# Patient Record
Sex: MALE | Race: WHITE | NOT HISPANIC OR LATINO | Employment: UNEMPLOYED | URBAN - METROPOLITAN AREA
[De-identification: names, ages, dates, MRNs, and addresses within clinical notes are randomized per-mention and may not be internally consistent; named-entity substitution may affect disease eponyms.]

---

## 2018-08-08 ENCOUNTER — COMMUNICATION - HEALTHEAST (OUTPATIENT)
Dept: TELEHEALTH | Facility: CLINIC | Age: 33
End: 2018-08-08

## 2018-08-08 ENCOUNTER — OFFICE VISIT - HEALTHEAST (OUTPATIENT)
Dept: FAMILY MEDICINE | Facility: CLINIC | Age: 33
End: 2018-08-08

## 2018-08-08 DIAGNOSIS — E11.9 TYPE 2 DIABETES MELLITUS WITHOUT COMPLICATION, WITHOUT LONG-TERM CURRENT USE OF INSULIN (H): ICD-10-CM

## 2018-08-08 DIAGNOSIS — Z48.89 ENCOUNTER FOR POST SURGICAL WOUND CHECK: ICD-10-CM

## 2018-08-08 DIAGNOSIS — Z76.89 ESTABLISHING CARE WITH NEW DOCTOR, ENCOUNTER FOR: ICD-10-CM

## 2018-08-08 DIAGNOSIS — E66.01 MORBID OBESITY (H): ICD-10-CM

## 2018-08-08 LAB
CHOLEST SERPL-MCNC: 194 MG/DL
FASTING STATUS PATIENT QL REPORTED: NO
HBA1C MFR BLD: 12.1 % (ref 3.5–6)
HDLC SERPL-MCNC: 49 MG/DL
LDLC SERPL CALC-MCNC: 118 MG/DL
TRIGL SERPL-MCNC: 136 MG/DL

## 2018-08-08 ASSESSMENT — MIFFLIN-ST. JEOR: SCORE: 2413.18

## 2018-08-13 ENCOUNTER — OFFICE VISIT - HEALTHEAST (OUTPATIENT)
Dept: FAMILY MEDICINE | Facility: CLINIC | Age: 33
End: 2018-08-13

## 2018-08-13 DIAGNOSIS — E11.9 DIABETES MELLITUS, TYPE 2 (H): ICD-10-CM

## 2018-08-13 LAB
ALBUMIN SERPL-MCNC: 3.6 G/DL (ref 3.5–5)
ALP SERPL-CCNC: 106 U/L (ref 45–120)
ALT SERPL W P-5'-P-CCNC: 19 U/L (ref 0–45)
ANION GAP SERPL CALCULATED.3IONS-SCNC: 14 MMOL/L (ref 5–18)
AST SERPL W P-5'-P-CCNC: 11 U/L (ref 0–40)
BILIRUB SERPL-MCNC: 0.6 MG/DL (ref 0–1)
BUN SERPL-MCNC: 12 MG/DL (ref 8–22)
CALCIUM SERPL-MCNC: 9.2 MG/DL (ref 8.5–10.5)
CHLORIDE BLD-SCNC: 100 MMOL/L (ref 98–107)
CO2 SERPL-SCNC: 22 MMOL/L (ref 22–31)
CREAT SERPL-MCNC: 0.8 MG/DL (ref 0.7–1.3)
GFR SERPL CREATININE-BSD FRML MDRD: >60 ML/MIN/1.73M2
GLUCOSE BLD-MCNC: 389 MG/DL (ref 70–125)
POTASSIUM BLD-SCNC: 4.2 MMOL/L (ref 3.5–5)
PROT SERPL-MCNC: 7 G/DL (ref 6–8)
SODIUM SERPL-SCNC: 136 MMOL/L (ref 136–145)

## 2018-08-16 ENCOUNTER — AMBULATORY - HEALTHEAST (OUTPATIENT)
Dept: EDUCATION SERVICES | Facility: CLINIC | Age: 33
End: 2018-08-16

## 2018-08-16 DIAGNOSIS — E08.65 DIABETES MELLITUS DUE TO UNDERLYING CONDITION WITH HYPERGLYCEMIA (H): ICD-10-CM

## 2018-09-10 ENCOUNTER — COMMUNICATION - HEALTHEAST (OUTPATIENT)
Dept: FAMILY MEDICINE | Facility: CLINIC | Age: 33
End: 2018-09-10

## 2018-09-10 DIAGNOSIS — E11.9 DIABETES MELLITUS, TYPE 2 (H): ICD-10-CM

## 2018-09-20 ENCOUNTER — OFFICE VISIT - HEALTHEAST (OUTPATIENT)
Dept: EDUCATION SERVICES | Facility: CLINIC | Age: 33
End: 2018-09-20

## 2018-09-20 DIAGNOSIS — E08.65 DIABETES MELLITUS DUE TO UNDERLYING CONDITION WITH HYPERGLYCEMIA (H): ICD-10-CM

## 2018-10-08 ENCOUNTER — OFFICE VISIT - HEALTHEAST (OUTPATIENT)
Dept: FAMILY MEDICINE | Facility: CLINIC | Age: 33
End: 2018-10-08

## 2018-10-08 DIAGNOSIS — E11.9 TYPE 2 DIABETES MELLITUS WITHOUT COMPLICATION, WITHOUT LONG-TERM CURRENT USE OF INSULIN (H): ICD-10-CM

## 2018-12-03 ENCOUNTER — OFFICE VISIT - HEALTHEAST (OUTPATIENT)
Dept: FAMILY MEDICINE | Facility: CLINIC | Age: 33
End: 2018-12-03

## 2018-12-03 DIAGNOSIS — E11.9 DIABETES MELLITUS, TYPE 2 (H): ICD-10-CM

## 2018-12-03 DIAGNOSIS — E08.65 DIABETES MELLITUS DUE TO UNDERLYING CONDITION WITH HYPERGLYCEMIA (H): ICD-10-CM

## 2018-12-03 LAB — HBA1C MFR BLD: 6.1 % (ref 3.5–6)

## 2018-12-03 RX ORDER — GLUCOSAMINE HCL/CHONDROITIN SU 500-400 MG
CAPSULE ORAL
Qty: 100 STRIP | Refills: 9 | Status: SHIPPED | OUTPATIENT
Start: 2018-12-03

## 2018-12-05 ENCOUNTER — COMMUNICATION - HEALTHEAST (OUTPATIENT)
Dept: FAMILY MEDICINE | Facility: CLINIC | Age: 33
End: 2018-12-05

## 2019-02-10 ENCOUNTER — COMMUNICATION - HEALTHEAST (OUTPATIENT)
Dept: EDUCATION SERVICES | Facility: CLINIC | Age: 34
End: 2019-02-10

## 2019-02-10 DIAGNOSIS — E08.65 DIABETES MELLITUS DUE TO UNDERLYING CONDITION WITH HYPERGLYCEMIA (H): ICD-10-CM

## 2019-03-27 ENCOUNTER — OFFICE VISIT - HEALTHEAST (OUTPATIENT)
Dept: FAMILY MEDICINE | Facility: CLINIC | Age: 34
End: 2019-03-27

## 2019-03-27 DIAGNOSIS — E11.9 DIABETES MELLITUS, TYPE 2 (H): ICD-10-CM

## 2019-03-27 LAB
CREAT UR-MCNC: 118.3 MG/DL
HBA1C MFR BLD: 6 % (ref 3.5–6)
MICROALBUMIN UR-MCNC: 0.94 MG/DL (ref 0–1.99)
MICROALBUMIN/CREAT UR: 7.9 MG/G

## 2019-08-21 ENCOUNTER — COMMUNICATION - HEALTHEAST (OUTPATIENT)
Dept: TELEHEALTH | Facility: CLINIC | Age: 34
End: 2019-08-21

## 2019-08-21 ENCOUNTER — AMBULATORY - HEALTHEAST (OUTPATIENT)
Dept: FAMILY MEDICINE | Facility: CLINIC | Age: 34
End: 2019-08-21

## 2019-08-21 ENCOUNTER — OFFICE VISIT - HEALTHEAST (OUTPATIENT)
Dept: FAMILY MEDICINE | Facility: CLINIC | Age: 34
End: 2019-08-21

## 2019-08-21 ENCOUNTER — COMMUNICATION - HEALTHEAST (OUTPATIENT)
Dept: FAMILY MEDICINE | Facility: CLINIC | Age: 34
End: 2019-08-21

## 2019-08-21 DIAGNOSIS — Z31.69 INFERTILITY COUNSELING: ICD-10-CM

## 2019-08-21 DIAGNOSIS — N46.9 MALE INFERTILITY: ICD-10-CM

## 2019-08-21 DIAGNOSIS — E11.9 DIABETES MELLITUS, TYPE 2 (H): ICD-10-CM

## 2019-08-21 DIAGNOSIS — Z31.41 FERTILITY TESTING: ICD-10-CM

## 2019-08-21 LAB — HBA1C MFR BLD: 6.2 % (ref 3.5–6)

## 2019-08-21 ASSESSMENT — MIFFLIN-ST. JEOR: SCORE: 2338.34

## 2019-08-23 ENCOUNTER — COMMUNICATION - HEALTHEAST (OUTPATIENT)
Dept: FAMILY MEDICINE | Facility: CLINIC | Age: 34
End: 2019-08-23

## 2019-08-29 DIAGNOSIS — Z31.41 ENCOUNTER FOR SPERM COUNT FOR FERTILITY TESTING: Primary | ICD-10-CM

## 2019-08-30 ENCOUNTER — RECORDS - HEALTHEAST (OUTPATIENT)
Dept: ADMINISTRATIVE | Facility: OTHER | Age: 34
End: 2019-08-30

## 2019-08-30 DIAGNOSIS — Z31.41 ENCOUNTER FOR SPERM COUNT FOR FERTILITY TESTING: Primary | ICD-10-CM

## 2019-08-30 PROCEDURE — 89320 SEMEN ANAL VOL/COUNT/MOT: CPT

## 2019-09-03 LAB
ABSTINENCE DAYS: 7 DAYS (ref 2–7)
AGGLUTINATION: NO YES/NO
ANALYSIS TEMP - CENTIGRADE: 23 CENTIGRADE
CELL FRAGMENTS: ABNORMAL %
COLLECTION METHOD: ABNORMAL
COLLECTION SITE: ABNORMAL
CONSENT TO RELEASE TO PARTNER: YES
IMMATURE SPERM: ABNORMAL %
IMMOTILE: 84 %
LAB RECEIPT TIME: ABNORMAL
LIQUEFIED: YES YES/NO
NON-PROGRESSIVE MOTILITY: 6 %
PROGRESSIVE MOTILITY: 10 % (ref 32–?)
ROUND CELLS: 0 MILLION/ML (ref ?–2)
SPECIMEN CONCENTRATION: 0.14 MILLION/ML (ref 15–?)
SPECIMEN PH: 7.2 PH (ref 7.2–?)
SPECIMEN TYPE: ABNORMAL
SPECIMEN VOL UR: 1.8 ML (ref 1.5–?)
TIME OF ANALYSIS: ABNORMAL
TOTAL NUMBER: 0.25 MILLION (ref 39–?)
TOTAL PROGRESSIVE MOTILE: 0.03 MILLION (ref 15.6–?)
VISCOUS: NO YES/NO
VITALITY: ABNORMAL % (ref 58–?)
WBC SPECIMEN: ABNORMAL %

## 2019-09-27 ENCOUNTER — OFFICE VISIT - HEALTHEAST (OUTPATIENT)
Dept: FAMILY MEDICINE | Facility: CLINIC | Age: 34
End: 2019-09-27

## 2019-09-27 DIAGNOSIS — N46.9 MALE INFERTILITY: ICD-10-CM

## 2019-09-27 DIAGNOSIS — E11.9 DIABETES MELLITUS, TYPE 2 (H): ICD-10-CM

## 2019-09-27 ASSESSMENT — MIFFLIN-ST. JEOR: SCORE: 2340.6

## 2019-11-26 ENCOUNTER — OFFICE VISIT - HEALTHEAST (OUTPATIENT)
Dept: FAMILY MEDICINE | Facility: CLINIC | Age: 34
End: 2019-11-26

## 2019-11-26 ENCOUNTER — COMMUNICATION - HEALTHEAST (OUTPATIENT)
Dept: TELEHEALTH | Facility: CLINIC | Age: 34
End: 2019-11-26

## 2019-11-26 DIAGNOSIS — Z00.00 ANNUAL PHYSICAL EXAM: ICD-10-CM

## 2019-11-26 DIAGNOSIS — E08.65 DIABETES MELLITUS DUE TO UNDERLYING CONDITION WITH HYPERGLYCEMIA, WITHOUT LONG-TERM CURRENT USE OF INSULIN (H): ICD-10-CM

## 2019-11-26 DIAGNOSIS — E66.01 MORBID OBESITY (H): ICD-10-CM

## 2019-11-26 LAB
ANION GAP SERPL CALCULATED.3IONS-SCNC: 14 MMOL/L (ref 5–18)
BUN SERPL-MCNC: 18 MG/DL (ref 8–22)
CALCIUM SERPL-MCNC: 9.4 MG/DL (ref 8.5–10.5)
CHLORIDE BLD-SCNC: 105 MMOL/L (ref 98–107)
CHOLEST SERPL-MCNC: 151 MG/DL
CO2 SERPL-SCNC: 22 MMOL/L (ref 22–31)
CREAT SERPL-MCNC: 0.81 MG/DL (ref 0.7–1.3)
FASTING STATUS PATIENT QL REPORTED: YES
GFR SERPL CREATININE-BSD FRML MDRD: >60 ML/MIN/1.73M2
GLUCOSE BLD-MCNC: 134 MG/DL (ref 70–125)
HBA1C MFR BLD: 6.5 % (ref 3.5–6)
HDLC SERPL-MCNC: 51 MG/DL
HIV 1+2 AB+HIV1 P24 AG SERPL QL IA: NEGATIVE
LDLC SERPL CALC-MCNC: 87 MG/DL
POTASSIUM BLD-SCNC: 4.1 MMOL/L (ref 3.5–5)
SODIUM SERPL-SCNC: 141 MMOL/L (ref 136–145)
TRIGL SERPL-MCNC: 64 MG/DL

## 2019-11-26 ASSESSMENT — MIFFLIN-ST. JEOR: SCORE: 2365.55

## 2019-11-27 ENCOUNTER — COMMUNICATION - HEALTHEAST (OUTPATIENT)
Dept: FAMILY MEDICINE | Facility: CLINIC | Age: 34
End: 2019-11-27

## 2020-03-11 ENCOUNTER — HEALTH MAINTENANCE LETTER (OUTPATIENT)
Age: 35
End: 2020-03-11

## 2020-06-02 ENCOUNTER — COMMUNICATION - HEALTHEAST (OUTPATIENT)
Dept: FAMILY MEDICINE | Facility: CLINIC | Age: 35
End: 2020-06-02

## 2020-06-02 DIAGNOSIS — E11.9 DIABETES MELLITUS, TYPE 2 (H): ICD-10-CM

## 2020-06-02 RX ORDER — METFORMIN HCL 500 MG
1000 TABLET, EXTENDED RELEASE 24 HR ORAL 2 TIMES DAILY
Qty: 120 TABLET | Refills: 5 | Status: SHIPPED | OUTPATIENT
Start: 2020-06-02

## 2020-10-27 ENCOUNTER — RECORDS - HEALTHEAST (OUTPATIENT)
Dept: ADMINISTRATIVE | Facility: OTHER | Age: 35
End: 2020-10-27

## 2020-10-27 LAB
CREAT SERPL-MCNC: 0.6 MG/DL (ref 0.76–1.27)
GFR ESTIMATE EXT - HISTORICAL: 130 ML/MIN/1.73M2
GFR ESTIMATE, IF BLACK EXT - HISTORICAL: 151 ML/MIN/1.73M2
HBA1C MFR BLD: 8.5 % (ref 4.8–5.6)

## 2020-11-04 ENCOUNTER — RECORDS - HEALTHEAST (OUTPATIENT)
Dept: HEALTH INFORMATION MANAGEMENT | Facility: CLINIC | Age: 35
End: 2020-11-04

## 2021-01-03 ENCOUNTER — HEALTH MAINTENANCE LETTER (OUTPATIENT)
Age: 36
End: 2021-01-03

## 2021-02-25 ENCOUNTER — RECORDS - HEALTHEAST (OUTPATIENT)
Dept: ADMINISTRATIVE | Facility: OTHER | Age: 36
End: 2021-02-25

## 2021-04-25 ENCOUNTER — HEALTH MAINTENANCE LETTER (OUTPATIENT)
Age: 36
End: 2021-04-25

## 2021-05-27 NOTE — PROGRESS NOTES
Please call patient and inform:  A1c is stable at 6.0.  Awesome job.  Kidneys also look fine.  Let us go ahead with the plan we discussed at our clinic visit today and I will see you in 6 months.

## 2021-05-27 NOTE — PROGRESS NOTES
[facility] clinic EXAM note      Chief Complaint   Patient presents with     Follow-up     medications     Medication Refill     metformin       Assessment & Plan    Problem List Items Addressed This Visit     None      Visit Diagnoses     Diabetes mellitus, type 2 (H)    : A1c came back at 6.0!  Continues to do an awesome job.  We will go forward with his plan to discontinue the Invokana for now.  Can finish up his last bottle and then take a break and continue to monitor his blood sugars during that time.  If he notices them start to go up on average he can restart the Invokana.  Discussed that also helps with weight gain which has been really nice for him having lost 30 pounds.  Otherwise we will continue the metformin 1000 mg twice a day.  At this point very well controlled and will have him follow-up in 6 months.  Microalbumin today.  Foot exam completed today as below.  We will plan for ophthalmology referral at next visit due in July.  Does not need statin at this time as no other CVD risk factors besides weight and blood pressures well controlled.    Relevant Medications    metFORMIN (GLUCOPHAGE-XR) 500 MG 24 hr tablet    Other Relevant Orders    Glycosylated Hemoglobin A1c (Completed)    Microalbumin, Random Urine          History    Everardo Payan is a 33 y.o.  male who presents for the following issues:    Follow-up diabetes type 2:  Patient comes in for follow-up last seen December 3.  A1c at that time had come down to 6.1!  He states blood sugars are still doing well in average 120.  Seem pretty stable in this range.  Looking through a scan 111-130 he had about 3 outliers in the last 4 months.  One being [place] Day.  He has lost 30 pounds since August.  Down to 304lb.  Last saw ophthalmologist in July.  He was unable to give urine sample at last visit so we will check microalbumin today.  He is wondering if his blood sugars continue to be well controlled and his A1c looks stable today if he can  come off the Invokana.  States $50 a month.  He still has a little bit left of his last refill.  He also notes that he has refills available that he could get within the next year if he notices his blood sugars start to go up if he stops taking it.  Otherwise he states he is doing well feeling good.  No vision changes, no numbness or tingling.        mEDICATIONS    Current Outpatient Medications on File Prior to Visit   Medication Sig Dispense Refill     blood glucose test (CONTOUR NEXT TEST STRIPS) strips Check blood sugar 1-2 times daily, rotate fasting and 2 after meal.. 100 strip 9     generic lancets (MICROLET LANCET) Dispense brand per patient's insurance at pharmacy discretion. 100 each 6     INVOKANA 100 mg Tab TAKE ONE TABLET BY MOUTH ONE TIME DAILY BEFORE THE FIRST MEAL OF THE DAY 30 tablet 3     [DISCONTINUED] metFORMIN (GLUCOPHAGE-XR) 500 MG 24 hr tablet Take 2 tablets (1,000 mg total) by mouth 2 (two) times a day. 120 tablet 5     No current facility-administered medications on file prior to visit.        Pertinent past medical, surgical, social and family history reviewed and updated in [place].    Social History     Socioeconomic History     Marital status: Single     Spouse name: Not on file     Number of children: Not on file     Years of education: Not on file     Highest education level: Not on file   Occupational History     Not on file   Social Needs     Financial resource strain: Not on file     Food insecurity:     Worry: Not on file     Inability: Not on file     Transportation needs:     Medical: Not on file     Non-medical: Not on file   Tobacco Use     Smoking status: Never Smoker     Smokeless tobacco: Never Used     Tobacco comment: no tobacco exposure   Substance and Sexual Activity     Alcohol use: Not on file     Drug use: Not on file     Sexual activity: Not on file   Lifestyle     Physical activity:     Days per week: Not on file     Minutes per session: Not on file     Stress: Not on  file   Relationships     Social connections:     Talks on phone: Not on file     Gets together: Not on file     Attends Sikhism service: Not on file     Active member of club or organization: Not on file     Attends meetings of clubs or organizations: Not on file     Relationship status: Not on file     Intimate partner violence:     Fear of current or ex partner: Not on file     Emotionally abused: Not on file     Physically abused: Not on file     Forced sexual activity: Not on file   Other Topics Concern     Not on file   Social History Narrative    Patient is .  He completed college.  He works at [employer].  No kids yet.         Review of systems     Pertinent Positives and negatives in HPI.     Physical Exam    /72 (Patient Site: Left Arm, Patient Position: Sitting, Cuff Size: Adult Large)   Pulse 62   Resp 18   Wt (!) 304 lb (137.9 kg)   BMI 47.61 kg/m    GEN:  33 y.o. male sitting comfortably in no apparent distress.   HEENT: EOMI, no scleral icterus, buccal mucosa moist   CHEST/LUNG: No respiratory distress  MSK:  Strength grossly normal  EXTR:  No cyanosis, no edema  SKIN: warm, dry, no rashes or lesions  NEURO: Gait normal, coordination intact  PSYCH:  Mood and affect appropriate  Diabetic foot exam:   Left: Skin intact, no lesions    Proprioception normal    Pulses intact,  warm, well perfused   Filament test present  Right: Skin intact, no lesions   Proprioception normal   Pulses intact, warm, well perfused   Filament test present      At the end of the encounter, I discussed results, diagnosis, medications. Discussed red flags for immediate return to clinic/ER, as well as indications for follow up if no improvement. Patient and/or caregiver understood and agreed to plan. Patient was stable for discharge.      Vane Rao

## 2021-05-31 NOTE — TELEPHONE ENCOUNTER
Orders being requested: The fax was received from order but it did not states what is ordered.  The tech states if the provider needs a semen analysis with strict morphology please state in order and refax to lab.  Reason service is needed/diagnosis: fertility  When are orders needed by: ASAP  Where to send Orders: Fax: [phone]  Okay to leave detailed message?  No

## 2021-05-31 NOTE — TELEPHONE ENCOUNTER
Who is calling:  [facility]  Reason for Call:  Caller states that she called sever times for Orders Semen Analysis With Strict Morphology and Diagnosis Code . Patient have appointment on Friday 8/30/19 morning requesting to fax orders ASAP by today . Fax # [phone].  Date of last appointment with primary care: 8/21/19  Okay to leave a detailed message: No

## 2021-05-31 NOTE — TELEPHONE ENCOUNTER
Who is calling:    [facility]  Reason for Call:    Nicci is requesting an update on the status of below message.  Patient has appointment next week.  Date of last appointment with primary care: N/A  Okay to leave a detailed message: Yes

## 2021-05-31 NOTE — TELEPHONE ENCOUNTER
----- Message from Vane Rao DO sent at 8/22/2019  6:27 PM CDT -----  Please call patient and inform:  A1c is 6.2. Continues to be very well controlled. Can continue on metformin alone. If you want you could even decrease by 1 pill a day (500mg) and see how that goes.

## 2021-05-31 NOTE — PROGRESS NOTES
Please call patient and inform:  A1c is 6.2. Continues to be very well controlled. Can continue on metformin alone. If you want you could even decrease by 1 pill a day (500mg) and see how that goes.

## 2021-05-31 NOTE — PROGRESS NOTES
[facility] CLINIC EXAM NOTE      Chief Complaint   Patient presents with     Other     fertility testing       ASSESSMENT & PLAN    Everardo was seen today for other.    Diagnoses and all orders for this visit:    Infertility counseling  -     Ambulatory referral to Infertility    Diabetes mellitus, type 2 (H) continue on metformin 1000 mg twice a day.  Check A1c today.  Fasting blood sugars sound well controlled.  If A1c well controlled could consider starting to come off the metformin.  Does not need statin or ACE at this time.  Will have eye exam done soon.  -     metFORMIN (GLUCOPHAGE-XR) 500 MG 24 hr tablet; Take 2 tablets (1,000 mg total) by mouth 2 (two) times a day.  -     Glycosylated Hemoglobin A1c        HISTORY    Everardo Payan is a 33 y.o.  male who presents for the following issues:    1.  Infertility counseling: Patient comes in looking for fertility testing.  He states his wife has PCOS.  They have been trying for awhile about a year and a half without getting pregnant.  They want to make sure that he is also not contributing to the infertility and would like his sperm tested.  He understands he will need to go elsewhere for this testing but I will place a referral for him today.  Encouraged him to have his wife be seen regarding the PCOS and that there are options to help her get pregnant.    2.  Diabetes check: Patient states he is doing well off the Invokana.  We met the end of March and he had stopped the Invokana because it was so expensive.  He is continued on the metformin 1000 mg twice a day.  Fasting blood sugars have been 100-130.  He just came due for his diabetic eye exam in July.  He states he will get his yearly eye exam done soon and I gave him my card so that the results could be faxed over.  Otherwise he is up-to-date.        MEDICATIONS    Current Outpatient Medications on File Prior to Visit   Medication Sig Dispense Refill     blood glucose test (CONTOUR NEXT TEST STRIPS) strips  Check blood sugar 1-2 times daily, rotate fasting and 2 after meal.. 100 strip 9     generic lancets (MICROLET LANCET) Dispense brand per patient's insurance at pharmacy discretion. 100 each 6     [DISCONTINUED] metFORMIN (GLUCOPHAGE-XR) 500 MG 24 hr tablet Take 2 tablets (1,000 mg total) by mouth 2 (two) times a day. 120 tablet 5     INVOKANA 100 mg Tab TAKE ONE TABLET BY MOUTH ONE TIME DAILY BEFORE THE FIRST MEAL OF THE DAY 30 tablet 3     No current facility-administered medications on file prior to visit.        Pertinent past medical, surgical, social and family history reviewed and updated in [place].    Social History     Socioeconomic History     Marital status: Single     Spouse name: Not on file     Number of children: Not on file     Years of education: Not on file     Highest education level: Not on file   Occupational History     Not on file   Social Needs     Financial resource strain: Not on file     Food insecurity:     Worry: Not on file     Inability: Not on file     Transportation needs:     Medical: Not on file     Non-medical: Not on file   Tobacco Use     Smoking status: Never Smoker     Smokeless tobacco: Never Used     Tobacco comment: no tobacco exposure   Substance and Sexual Activity     Alcohol use: Not on file     Drug use: Not on file     Sexual activity: Not on file   Lifestyle     Physical activity:     Days per week: Not on file     Minutes per session: Not on file     Stress: Not on file   Relationships     Social connections:     Talks on phone: Not on file     Gets together: Not on file     Attends Orthodoxy service: Not on file     Active member of club or organization: Not on file     Attends meetings of clubs or organizations: Not on file     Relationship status: Not on file     Intimate partner violence:     Fear of current or ex partner: Not on file     Emotionally abused: Not on file     Physically abused: Not on file     Forced sexual activity: Not on file   Other Topics  "Concern     Not on file   Social History Narrative    Patient is .  He completed college.  He works at [employer].  No kids yet.         REVIEW OF SYSTEMS    ROS: 10 pt review of systems preformed and all negative except noted in HPI.     PHYSICAL EXAM    /84 (Patient Site: Left Arm, Patient Position: Sitting, Cuff Size: Adult Large)   Pulse 70   Ht 5' 7\" (1.702 m)   Wt (!) 318 lb 8 oz (144.5 kg)   BMI 49.88 kg/m    GEN:  33 y.o. male sitting comfortably in no apparent distress.   HEENT: EOMI, no scleral icterus  CHEST/LUNG: No respiratory distress  ABD: Obese  NEURO: Gait normal, coordination intact  PSYCH:  Mood and affect appropriate      At the end of the encounter, I discussed results, diagnosis, medications. Discussed red flags for immediate return to clinic/ER, as well as indications for follow up if no improvement. Patient and/or caregiver understood and agreed to plan. Patient was stable for discharge.    Vane Rao"

## 2021-05-31 NOTE — TELEPHONE ENCOUNTER
Called patient and relayed the below message. Patient was agreeable and had no questions. He agreed to decrease by 1 pill; his AM dose.

## 2021-06-01 ENCOUNTER — RECORDS - HEALTHEAST (OUTPATIENT)
Dept: ADMINISTRATIVE | Facility: CLINIC | Age: 36
End: 2021-06-01

## 2021-06-01 VITALS — BODY MASS INDEX: 53.09 KG/M2 | WEIGHT: 315 LBS

## 2021-06-01 VITALS — HEIGHT: 67 IN | BODY MASS INDEX: 49.44 KG/M2 | WEIGHT: 315 LBS

## 2021-06-01 VITALS — WEIGHT: 315 LBS | BODY MASS INDEX: 52.81 KG/M2

## 2021-06-01 NOTE — PROGRESS NOTES
[facility] CLINIC EXAM NOTE      Chief Complaint   Patient presents with     Follow-up     fertility         ASSESSMENT & PLAN    Everardo was seen today for follow-up.    Diagnoses and all orders for this visit:    Male infertility:  Reviewed results and copy given showing low sperm counts and low motility. Offered referral to infertility clinic for him and his wife (she has PCOS). States they will discuss and think about what they would like to do next/if they want to have kids and let me know.     Diabetes mellitus, type 2 (H):  Very well controlled. Weaning off metformin and BS checks still seems to be good! He will do his eye screening soon (on his to do list). Will have him f/u in November for annual physical and a1c check.   -     metFORMIN (GLUCOPHAGE-XR) 500 MG 24 hr tablet; Take 2 tablets (1,000 mg total) by mouth 2 (two) times a day.      HISTORY    Everardo Payan is a 34 y.o.  male who presents for the following issues:    1. F/u infertility: Had sperm testing done. Has not received the results yet, here to discuss.     2. F/U diabetes: Has been off the invokana since the spring. A1c remains well controlled at last check of 6.2. He has cut back on his metformin since last month. Takes 1 tab in AM and 2 tabs in PM. BS have been mostly in the 120s fasting.       MEDICATIONS    Current Outpatient Medications on File Prior to Visit   Medication Sig Dispense Refill     blood glucose test (CONTOUR NEXT TEST STRIPS) strips Check blood sugar 1-2 times daily, rotate fasting and 2 after meal.. 100 strip 9     generic lancets (MICROLET LANCET) Dispense brand per patient's insurance at pharmacy discretion. 100 each 6     ibuprofen (ADVIL,MOTRIN) 100 MG tablet Take 1-2 tablets by mouth every 4-6 hours as needed for pain.       No current facility-administered medications on file prior to visit.        Pertinent past medical, surgical, social and family history reviewed and updated in [facility].    Social History  "    Socioeconomic History     Marital status: Single     Spouse name: Not on file     Number of children: Not on file     Years of education: Not on file     Highest education level: Not on file   Occupational History     Not on file   Social Needs     Financial resource strain: Not on file     Food insecurity:     Worry: Not on file     Inability: Not on file     Transportation needs:     Medical: Not on file     Non-medical: Not on file   Tobacco Use     Smoking status: Never Smoker     Smokeless tobacco: Never Used     Tobacco comment: no tobacco exposure   Substance and Sexual Activity     Alcohol use: Not on file     Drug use: Not on file     Sexual activity: Not on file   Lifestyle     Physical activity:     Days per week: Not on file     Minutes per session: Not on file     Stress: Not on file   Relationships     Social connections:     Talks on phone: Not on file     Gets together: Not on file     Attends Latter day service: Not on file     Active member of club or organization: Not on file     Attends meetings of clubs or organizations: Not on file     Relationship status: Not on file     Intimate partner violence:     Fear of current or ex partner: Not on file     Emotionally abused: Not on file     Physically abused: Not on file     Forced sexual activity: Not on file   Other Topics Concern     Not on file   Social History Narrative    Patient is .  He completed college.  He works at [employer].  No kids yet.         REVIEW OF SYSTEMS    ROS: 10 pt review of systems preformed and all negative except noted in HPI.     PHYSICAL EXAM    /84 (Patient Site: Left Arm, Patient Position: Sitting, Cuff Size: Adult Large)   Pulse 86   Ht 5' 7\" (1.702 m)   Wt (!) 319 lb (144.7 kg)   BMI 49.96 kg/m    GEN:  34 y.o. male sitting comfortably in no apparent distress. Obese  HEENT: EOMI, no scleral icterus, buccal mucosa moist  CHEST/LUNG: No respiratory distress  SKIN: warm, dry, no rashes or " lesions  NEURO: Gait normal, coordination intact  PSYCH:  Mood and affect appropriate      Vane Rao

## 2021-06-02 VITALS — WEIGHT: 315 LBS | BODY MASS INDEX: 52.31 KG/M2

## 2021-06-02 VITALS — BODY MASS INDEX: 49.89 KG/M2 | WEIGHT: 315 LBS

## 2021-06-02 VITALS — WEIGHT: 304 LBS | BODY MASS INDEX: 47.61 KG/M2

## 2021-06-02 VITALS — BODY MASS INDEX: 52.37 KG/M2 | WEIGHT: 315 LBS

## 2021-06-03 VITALS
BODY MASS INDEX: 49.44 KG/M2 | WEIGHT: 315 LBS | HEART RATE: 86 BPM | HEIGHT: 67 IN | DIASTOLIC BLOOD PRESSURE: 84 MMHG | SYSTOLIC BLOOD PRESSURE: 122 MMHG

## 2021-06-03 VITALS — BODY MASS INDEX: 49.44 KG/M2 | WEIGHT: 315 LBS | HEIGHT: 67 IN

## 2021-06-03 NOTE — TELEPHONE ENCOUNTER
----- Message from Vane Rao DO sent at 11/27/2019 11:47 AM CST -----  Please call patient and inform:  Labs normal. Cholesterol looks great! Your A1c is 6.5. Lets continue at your current Metformin dose and follow up in 6 months.

## 2021-06-03 NOTE — PROGRESS NOTES
Chief Complaint   Patient presents with     Annual Exam       Adult Male Physical    Problem List Items Addressed This Visit        ENT/CARD/PULM/ENDO Problems    Diabetes mellitus due to underlying condition with hyperglycemia, without long-term current use of insulin (H): Has been very well controlled. We are weaning off the metformin, already stopped invokana. He is taking 500mg in AM and 1000mg in PM. Due for A1c today. Completed eye exam although I do not have records yet. He says everything was stable. If A1c looks good okay to f/u in 6 months.     Relevant Orders    Basic Metabolic Panel    Glycosylated Hemoglobin A1c      Other Visit Diagnoses     Annual physical exam    -  Primary: Labs as below. Up todate on immunizations.     Relevant Orders    Lipid Profile    Basic Metabolic Panel    Glycosylated Hemoglobin A1c    HIV Antigen/Antibody Screening [facility]    Morbid obesity (H)    : encouraged further weight loss. Has gained some back after stopping invokana. He has done great with diet changes and decreasing his carbs.     Relevant Orders    Lipid Profile    Glycosylated Hemoglobin A1c          HPI  Current Concerns/ Questions  None, doing well. BS have been 120-130s.      Current medications reviewed as follows:  Current Outpatient Medications on File Prior to Visit   Medication Sig     blood glucose test (CONTOUR NEXT TEST STRIPS) strips Check blood sugar 1-2 times daily, rotate fasting and 2 after meal..     generic lancets (MICROLET LANCET) Dispense brand per patient's insurance at pharmacy discretion.     ibuprofen (ADVIL,MOTRIN) 100 MG tablet Take 1-2 tablets by mouth every 4-6 hours as needed for pain.     metFORMIN (GLUCOPHAGE-XR) 500 MG 24 hr tablet Take 2 tablets (1,000 mg total) by mouth 2 (two) times a day.     No current facility-administered medications on file prior to visit.      Patient Active Problem List   Diagnosis     BMI 50.0-59.9, adult (H)     GARRETT (obstructive sleep apnea)     Shift  work sleep disorder     Type 2 diabetes mellitus without complication     Male infertility     Diabetes mellitus due to underlying condition with hyperglycemia, without long-term current use of insulin (H)     Past Medical History:   Diagnosis Date     Appendicitis      Essential hypertension 12/7/2015    Overview:  Patient declined to take medication.  Understands risk of having untreated hypertension.     GARRETT (obstructive sleep apnea) 1/8/2016    Overview:  Severe GARRETT (obstructive sleep apnea)     Type 2 diabetes mellitus without complication 8/8/2018     Past Surgical History:   Procedure Laterality Date     APPENDECTOMY  07/14/2018     Family History   Problem Relation Age of Onset     Diabetes Father      Hypertension Father      Hypertension Sister      Diabetes Sister         prediabetes     Social History     Tobacco Use   Smoking Status Never Smoker   Smokeless Tobacco Never Used   Tobacco Comment    no tobacco exposure       Other Habits:  Alcohol/ Drug use? no  Sexually active: yes, infertility. Taking medication that is supposed to help with this. Will let me know if they need referral.   Self testicular exams: yes, every once in awhile.     Social History     Social History Narrative    Patient is .  He completed college.  He works at [employer].  No kids yet.     Immunization History   Administered Date(s) Administered     DTP 1985, 01/06/1986, 03/21/1986, 03/06/1987, 08/27/1990     HIB (HBOC) 09/15/1988     Hep B, Peds or Adolescent 08/19/1997, 10/16/1997, 02/13/1998     MMR 12/11/1986, 08/04/1994     OPV,Trivalent,Historic(1003-8816 only) 1985, 01/06/1986, 03/06/1987, 08/27/1990     Td, Adult, Absorbed 12/19/1996     Tdap 11/19/2015       HealthCare Maintance  Immunizations: uptodate  Cancer Screening: N/A  Vascular prevention N/A  Weight maintaining. Had lost weight on Invokana but is expensive so this is the first one we took him off of.   Dental: has been awhile.  "Encouraged him to see dentist. It is on his to do list.   Body mass index is 50.82 kg/m .    ROS  10 pt ROS performed and negative except for HPI.     Objective  Physical Exam  Vitals:    11/26/19 0753   BP: 124/82   Patient Site: Left Arm   Patient Position: Sitting   Cuff Size: Adult Large   Pulse: 82   Resp: 18   Temp: 97.9  F (36.6  C)   TempSrc: Oral   SpO2: 96%   Weight: (!) 324 lb 8 oz (147.2 kg)   Height: 5' 7\" (1.702 m)       Gen- alert and oriented x3, no acute distress  HEENT- Normocephalic atraumatic   pupils equal and reactive, EOMI.    TMs visualized and normal, ear canals normal.    Mouth moist with normal mucosa no ulceration, dentition in poor repair.   Neck- supple, no adenopathy or thyromegaly  Chest- Normal chest wall apperance, normal inspiration and expiration.  Clear to asculation.  CV- Regular rate and rhythm, normal tones, no murmus, gallops or rubs.  Abd-  Soft, nodistended, nontender.  Normal bowel sounds, no mass or organ enlargement.  Genitalia- Deferred, no concerns.   Ext- Atraumatic,  No synovial thickening. Good perfusion, no edema. Periph pulses detected  Skin- warm and dry, no rash  Neuro- Cranial nerves grossly intact.  Normal gait, normal strength.  Reflexes symmetric.  Coordination intact.        Vane Rao      "

## 2021-06-03 NOTE — PROGRESS NOTES
Please call patient and inform:  Labs normal. Cholesterol looks great! Your A1c is 6.5. Lets continue at your current Metformin dose and follow up in 6 months.

## 2021-06-04 VITALS
DIASTOLIC BLOOD PRESSURE: 82 MMHG | WEIGHT: 315 LBS | RESPIRATION RATE: 18 BRPM | TEMPERATURE: 97.9 F | HEART RATE: 82 BPM | HEIGHT: 67 IN | BODY MASS INDEX: 49.44 KG/M2 | SYSTOLIC BLOOD PRESSURE: 124 MMHG | OXYGEN SATURATION: 96 %

## 2021-06-16 PROBLEM — E11.9 TYPE 2 DIABETES MELLITUS WITHOUT COMPLICATION (H): Status: ACTIVE | Noted: 2018-08-08

## 2021-06-16 PROBLEM — N46.9 MALE INFERTILITY: Status: ACTIVE | Noted: 2019-09-29

## 2021-06-16 PROBLEM — E08.65 DIABETES MELLITUS DUE TO UNDERLYING CONDITION WITH HYPERGLYCEMIA, WITHOUT LONG-TERM CURRENT USE OF INSULIN (H): Status: ACTIVE | Noted: 2019-11-26

## 2021-06-19 NOTE — PROGRESS NOTES
[facility] clinic EXAM note      Chief Complaint   Patient presents with     Establish Care     Follow-up     appendectomy, 3 weeks ago, moderate pain, no fever       Assessment & Plan    Problem List Items Addressed This Visit     Type 2 diabetes mellitus without complication: A1c returned at 12.1. Contacting patient to return to discuss  Further. Will likely need to start insulin to get number down.       Other Visit Diagnoses     Establishing care with new doctor, encounter for    -  Primary: hx, all and meds reviewed and updated in the chart.     Encounter for post surgical wound check    : Discussed leaving the wound open to the air to let it dry.  I would not go swimming in any pools/lakes or should cover up during this time until it has fully healed.  Okay to shower.  Discussed my concern that he may have diabetes causing poor wound healing.  Discussed monitoring for signs of infection such as fever, spreading erythema or purulent drainage.  Return immediately if has any of these.  Otherwise does not look infected but just healing slowly today.    Morbid obesity (H)    : with hyperglycemia noted at recent hospital visit and poor wound healing concerning for diatbes.  As well as family history-check labs below.     Relevant Order    Glycosylated Hemoglobin A1c (Completed)    Lipid Cascade (Completed)            History    Everardo Payan is a 32 y.o.  male who presents for the following issues:    Patient is here to establish care and is concerned that his appendectomy scar isn't healed yet/healing properly. Hasn't seen a doctor in about 10 years (primary care). Does see pulmonology for history of GARRETT is on CPAP. Went without insurance for awhile.   Had surgery for acute appendicitis on 7/14 at [facility]. Told everything was healing as normal and he could shower and swim in pools and lakes. He has gone swimming a couple times since. Last showered on Monday. Has been covering wound with gauze and tape.  "Two of the sites healed fine but the one at the umbilicus is the one he is worried about.     Of note- hospital encounter reviewed and patient had glucose level of 303 at admission. dad has hx of diabetes. Patient states he has been tested in the past and it was negative. He denies any polyuria, polydipsia, weight changes,     mEDICATIONS    No current outpatient prescriptions on file prior to visit.     No current facility-administered medications on file prior to visit.        Pertinent past medical, surgical, social and family history reviewed and updated in [facility].    Social History     Social History     Marital status: Single     Spouse name: N/A     Number of children: N/A     Years of education: N/A     Occupational History     Not on file.     Social History Main Topics     Smoking status: Never Smoker     Smokeless tobacco: Never Used      Comment: no tobacco exposure     Alcohol use Not on file     Drug use: Not on file     Sexual activity: Not on file     Other Topics Concern     Not on file     Social History Narrative         Review of systems    ROS: 14 pt review of systems preformed and all negative except noted in HPI.       Physical Exam    /76 (Patient Site: Left Arm, Patient Position: Sitting, Cuff Size: Adult Large)  Pulse 64  Temp 97.8  F (36.6  C) (Oral)   Resp 20  Ht 5' 7\" (1.702 m)  Wt (!) 335 lb (152 kg)  BMI 52.47 kg/m2  GEN:  32 y.o. male sitting comfortably in no apparent distress.  Morbidly obese  HEENT: EOMI, no scleral icterus, buccal mucosa moist  CHEST/LUNG: No respiratory distress, good air flow to bases, CTAB   CV: RRR, S1, S2 normal; no murmurs, rubs or gallops. No edema. Pulses 2 out of 4 radially bilaterally.  ABD:  Soft, non-tender, non distended, no guarding,  No masses  MSK:  Strength grossly normal  EXTR:  No cyanosis  SKIN: 2 smaller incisions on the abdomen- left abdomen and lower abdomen are well healed and normal.  The incision made at the inferior portion of " the umbilicus measuring about 2 cm in length is still erythematous raised along the border and has a wet scab in the middle.  No purulent drainage.  No surrounding erythema.  No area of fluctuance or induration.  NEURO: Gait normal, coordination intact  PSYCH: Mildly flat affect.      Vane Rao

## 2021-06-19 NOTE — PROGRESS NOTES
Assessment:  Met with Josiah olmos, new dx of diabetes.  Dx while in hospital for appendectomy.     Current dm medication:  Metformin XR one 500 mg tablet, increase as tolerated to one tablet two times a day, goal of two tablets two times a day    A1c 12.1    Instructed on basics of type 2 dm, smbg using Kath Next EZ meter, demo ' d without difficulty, bg at visit 246 2 PC.  Instructed on bg goals, AC and PC, check once daily, rotate fasting and/or 2 PC.   Log book provided for documentation.     Instructed on basics of heart healthy eating using plate method, food models and serving dishes to illustrate cho foods, affect on bg and need for portion control and variety at meals. Instructed on cho counting and label reading for cho,fat, whole grain and serving size, devised meal plan with 4 cho per meal, max of 5 per meal.   Provided meal plans for easy breakfast and lunches as well as dinners.  Instructed on need for meal consistency with portion control, variety and moderation.  Receptive to education, verbalized understanding and motivated to implement needed changes to gain better control of bg and lose wt.    Recall indicates three meals per day, breakfast sandwich with whole grain English muffin, egg and sausage, lunch is generally a sandwich.  Dinner varies.. Meat, rice,potato vege, spaghetti, tacos etc.  Eats fast food at least once per week, trying to cut back.  Has been trying to make better choices, after today's session will be making more changes, as stated above, receptive to education and motivated to learn more and make changes.     Plan: Follow up with CDE 9.2018, MD 10.2018    Subjective and Objective:      Everardo Payan is referred by  for Diabetes Education.     Lab Results   Component Value Date    HGBA1C 12.1 (H) 08/08/2018       Current diabetes medications:    Metformin XR one 500 mg tablet, increase as tolerated to one tablet two times a day, goal of two tablets two times a  day      Goals       monitoring            1. Check bg once daily, rotate fasting and 2 PC  2. A1c < 7.5        nutrition            1. Read label for cho and serving size  2. Keep cho content 4 per meal max of 5            Follow up:   CDE (certified diabetic educator)      Education:     Monitoring   Meter (per above goals): Assessed, Discussed, Literature provided and Patient returned demonstration  Monitoring: Assessed, Discussed and Literature provided  BG goals: Assessed, Discussed and Literature provided    Nutrition Management  Nutrition Management: Assessed, Discussed and Literature provided  Weight: Assessed and Discussed  Portions/Balance: Assessed, Discussed and Literature provided  Carb ID/Count: Assessed, Discussed and Literature provided  Label Reading: Assessed, Discussed and Literature provided  Heart Healthy Fats: Assessed, Discussed and Literature provided  Menu Planning: Assessed and Discussed  Dining Out: Assessed, Discussed and Needs instruction/review at follow-up  Physical Activity: Assessed, Discussed and Needs instruction/review at follow-up  Medications: Assessed, Discussed and Competent  Orals: Competent    Diabetes Disease Process: Discussed    Acute Complications: Prevent, Detect, Treat:  Hypoglycemia: Assessed  Hyperglycemia: Assessed and Discussed    Chronic Complications  Foot Care:Needs instruction/review at follow-up  Skin Care: Needs instruction/review at follow-up  Eye: Needs instruction/review at follow-up  ABC: Assessed and Discussed  Teeth:Needs instruction/review at follow-up  Goal Setting and Problem Solving: Discussed  Barriers: Assessed  Psychosocial Adjustments: Assessed      Time spent with the patient: 60 minutes for diabetes education and counseling.   Previous Education: no  Visit Type:WINDY Suarez  8/16/2018

## 2021-06-19 NOTE — PROGRESS NOTES
"Chief Complaint   Patient presents with     Follow-up     lab results       Assessment:      Diabetes Mellitus type II, under poor control.      Plan:        1.  Rx changes: Patient started on Metformin 500mg. To increase by one tablet each week until 2 tabs twice a day or 1000 mg twice a day.  We discussed that I would recommend that he start on a long-acting insulin nightly.  Patient is afraid of insulin because of \"the air bubbles in the syringe \".  We discussed that oftentimes with an A1c this high it does need insulin help bring it down.  However since patient is just getting used to the diagnosis and seems relatively aware of the diagnosis because his dad has diabetes I did offer the opportunity to start on the Metformin and focus on diet and exercise changes if he feels like he is motivated enough.  He does feel motivated to work on his diet and make some exercise changes.  We discussed referral to diabetic education ASAP.  2.  Education: Reviewed  ABCs  of diabetes management (respective goals in parentheses):  A1C (<7), blood pressure (<130/80), and cholesterol (LDL <100).  Handout given on glycemic index.  We reviewed the foods together.  3.  CMP today to assess kidney function and liver enzymes given obesity  4. Referral to diabetic education  5. Follow up: 6 weeks     Subjective:      Everardo Payan is a 32 y.o. male who presents for an initial evaluation of Type 2 diabetes mellitus.  Current symptoms/problems include hyperglycemia noted at recent hospital visit for appendicitis and noted ot have poor wound healing when present to clinic last week for establishing care visit and have been unchanged.  I did grab an A1c at that time for concerns of diabetes.  Came back at 12.  He states his dad has diabetes so he does have an understanding of it.  Dad is on oral medications.  No questions or concerns at this time in particular.      The patient was initially diagnosed with Type 2 diabetes mellitus based " on the following criteria:  hgbA1c 12.    Known diabetic complications: none  Cardiovascular risk factors: diabetes mellitus, male gender, obesity (BMI >= 30 kg/m2) and sedentary lifestyle  Current diabetic medications include none.     Eye exam current (within one year): no  Prior visit with dietician: no  Current diet: Endorses whole grains, fruits and vegetables.  Prefers sweet potatoes to regular potatoes.  Current exercise: Currently on a weight restriction of 20 pounds so did not want to go to the gym because he thinks his cardio is boring.    Current monitoring regimen: none      Is He on ACE inhibitor or angiotensin II receptor blocker?   No       The following portions of the patient's history were reviewed and updated as appropriate: allergies, current medications, past medical history, past social history, past surgical history and problem list.    Review of Systems  Pertinent items are noted in HPI.      Objective:      /72 (Patient Site: Left Arm, Patient Position: Sitting, Cuff Size: Adult Large)  Pulse 68  Resp 18  Wt (!) 339 lb (153.8 kg)  BMI 53.09 kg/m2    General:  alert, appears stated age, cooperative and morbidly obese   Oropharynx: lips, mucosa, and tongue normal; teeth and gums normal    Eyes:  negative   Lung: clear to auscultation bilaterally and no respiratory distress   Abdomen: soft, non-tender; bowel sounds normal; no masses,  no organomegaly   Extremities: extremities normal, atraumatic, no cyanosis or edema   Skin: warm and dry, no hyperpigmentation, vitiligo, or suspicious lesions   Neuro: normal without focal findings and mental status, speech normal, alert and oriented x3     Lab Review  CO2 (mmol/L)   Date Value   08/13/2018 22     BUN (mg/dL)   Date Value   08/13/2018 12     Creatinine (mg/dL)   Date Value   08/13/2018 0.80

## 2021-06-19 NOTE — LETTER
Letter by Vane Rao DO at      Author: Vane Rao DO Service: -- Author Type: --    Filed:  Encounter Date: 11/27/2019 Status: Signed         Everardo Payan  [address] [place] [address]             November 27, 2019         Dear Mr. Payan,    Below are the results from your recent visit:    Resulted Orders   Lipid Profile   Result Value Ref Range    Triglycerides 64 <=149 mg/dL    Cholesterol 151 <=199 mg/dL    LDL Calculated 87 <=129 mg/dL    HDL Cholesterol 51 >=40 mg/dL    Patient Fasting > 8hrs? Yes    Basic Metabolic Panel   Result Value Ref Range    Sodium 141 136 - 145 mmol/L    Potassium 4.1 3.5 - 5.0 mmol/L    Chloride 105 98 - 107 mmol/L    CO2 22 22 - 31 mmol/L    Anion Gap, Calculation 14 5 - 18 mmol/L    Glucose 134 (H) 70 - 125 mg/dL    Calcium 9.4 8.5 - 10.5 mg/dL    BUN 18 8 - 22 mg/dL    Creatinine 0.81 0.70 - 1.30 mg/dL    GFR MDRD Af Amer >60 >60 mL/min/1.73m2    GFR MDRD Non Af Amer >60 >60 mL/min/1.73m2    Narrative    Fasting Glucose reference range is 70-99 mg/dL per  American Diabetes Association (ADA) guidelines.   Glycosylated Hemoglobin A1c   Result Value Ref Range    Hemoglobin A1c 6.5 (H) 3.5 - 6.0 %   HIV Antigen/Antibody Screening Cascade   Result Value Ref Range    HIV Antigen / Antibody Negative Negative    Narrative    Method is Abbott HIV Ag/Ab for the detection of HIV p24 antigen, HIV-1 antibodies and HIV-2 antibodies.         Please call with questions or contact us using [facility].    Sincerely,        Electronically signed by Vane Rao DO

## 2021-06-19 NOTE — PROGRESS NOTES
Please call patient and inform:  Your blood sugar test is positive for diabetes and quite elevated. You should come in so we can discuss the diagnsosi. Often times when it is this high we need to start insulin to help get it under control and then can often switch to oral medications after that. Please let me know if you have any questions at this time. Otherwise- please come in soon.

## 2021-06-20 ENCOUNTER — HEALTH MAINTENANCE LETTER (OUTPATIENT)
Age: 36
End: 2021-06-20

## 2021-06-20 NOTE — PROGRESS NOTES
[facility] clinic EXAM note      Chief Complaint   Patient presents with     Follow-up     medications         Assessment & Plan    Problem List Items Addressed This Visit     Type 2 diabetes mellitus without complication - Primary: We reviewed long-term diabetes maintenance and the reason we do these test.  Was going to collect microalbumin.  He states he is unable to go to the bathroom right now.  We will plan to collect at next visit.  We also discussed yearly eye exams.  States he went to [place] 3-4 months ago when his vision is fine.  He would like to hold off on being seen an ophthalmologist since he just went to the eye doctor.  We will plan on referring him at a later date.  Also discussed good foot care and to check his feet on a daily basis.next follow-up appointment is with Jazzy in February.  I will see him back 2 months early December for check recheck of A1c.  Will consider starting a low-dose of lisinopril and statin at next visit.          History    Everardo Payan is a 33 y.o.  male who presents for the following issues:    Patient here to follow-up on his diabetes: Has seen Jazzy our diabetic educator twice since I initially diagnosed him with diabetes beginning of August.  He was able to increase his metformin to 2 tabs twice a day for a total of 2000 mg a day.  In addition she did start him on 100 mg of Invokana on 9/20 at her last visit.  He states since starting the Invokana he has noticed great decrease in his blood sugar levels.  He is currently testing fasting blood sugars right now.  States the test strips are too expensive to test more than that.  I do not know if he is a little bit confused about when to check fasting but it seems like he checks fasting but at different times during the day he but this could be also due to his sleep schedule.  And I think he also might not be eating 3 meals a day.  But looking back his blood sugars have definitely decreased.  Looking over the last  couple weeks I have 145, 122, 150, 139, 155, 162.  He did have some more elevated blood sugars when he went camping the last week and in September and these were 181 and 171 and 165.  He also notes that he has been much more mindful about what he is eating.  Cutting back on his carbs and watching for sugar and things like spaghetti sauce.  He also make sure he goes to the gym 1-2 times a week.  For his job he does lift cakes and toss them and states this is a pretty good workout as well.  He is not having any side effects from his 2 medications.  He has no issues or concerns today.  Overall happy with seeing that the blood sugars are going down.  And noticing certain foods that can increase his blood sugars.    mEDICATIONS    Current Outpatient Prescriptions on File Prior to Visit   Medication Sig Dispense Refill     blood glucose test (CONTOUR NEXT TEST STRIPS) strips Check blood sugar 1-2 times daily, rotate fasting and 2 after meal. 100 strip 9     canagliflozin (INVOKANA) 100 mg Tab Take 1 tablet (100 mg total) by mouth daily. Before the 1st meal of the day 30 tablet 4     generic lancets (MICROLET LANCET) Dispense brand per patient's insurance at pharmacy discretion. 100 each 6     metFORMIN (GLUCOPHAGE-XR) 500 MG 24 hr tablet Take 2 tablets (1,000 mg total) by mouth 2 (two) times a day. 120 tablet 5     No current facility-administered medications on file prior to visit.        Pertinent past medical, surgical, social and family history reviewed and updated in [facility].    Social History     Social History     Marital status: Single     Spouse name: N/A     Number of children: N/A     Years of education: N/A     Occupational History     Not on file.     Social History Main Topics     Smoking status: Never Smoker     Smokeless tobacco: Never Used      Comment: no tobacco exposure     Alcohol use Not on file     Drug use: Not on file     Sexual activity: Not on file     Other Topics Concern     Not on file      Social History Narrative    Patient is .  He completed college.  He works at [employer].  No kids yet.         Review of systems     Pertinent Positives and negatives in HPI.     Physical Exam    /86 (Patient Site: Right Arm, Patient Position: Sitting, Cuff Size: Adult Large)  Pulse 68  Resp 18  Wt (!) 334 lb (151.5 kg)  BMI 52.31 kg/m2  GEN:  33 y.o. male sitting comfortably in no apparent distress.   HEENT: EOMI, no scleral icterus, buccal mucosa moist  CHEST/LUNG: No respiratory distress  SKIN: warm, dry, no rashes or lesions  NEURO: Gait normal, coordination intact  PSYCH:  Mood and affect appropriate      I spent a total of 15 minutes face to face with the patient. Over 50% of the time was spent counseling and educating the patient about diabetes management, medications as well as health maintenance associated with diabetes.      Vane Rao

## 2021-06-20 NOTE — PROGRESS NOTES
Assessment:  Follow up with Everardo today, brings in meter, bg noted below.  Taking metformin XR as directed without difficulty.    Current Dm medications:  Metformin XR two 500 mg tablets two times a day    A1c 12.1      Bg:  Fasting    PC  198  192                 190  146                  252  168  158                  229  200                       167                  197  176  218    Bg have come down, < 200 mg/dl a few > 200 which are mainly PC readings.  Reviewed bg goals fasting and PC with current medications and intake, believe another agent is indicated at this time.  Discussed GLP-1, benefits of both wt loss and bg mgmt which he would benefit greatly from, would prefer not take an injection at this time, would rather try another oral agent. Strongly encouraged Josiah to consider this for future use.  Given above bg recommend start of Invokana 100 mg daily.    Medication adjustments per protocol  Spoke with Dr. Mg, verbal order to start invokana 100 mg daily    Instructed Josiah to take one tablet daily in the AM, verbalized understanding.    Recall indicates three meals per day, has cut portions in half, eating less at meals, still choosing high fat foods but has but back on portions.  Reading labels for cho content and serving size, has learned a lot from this, wt is down 3# in one month, applauded Josiah on this.  Reviewed current meals/food choices, eating damico and sausage daily did not care for ham on egg sandwich, encouraged two eggs and/or one slice of cheese vs damico and sausage every day.  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  Noon snack is bag of chips and sugar free red bull, dinner is sandwich and  When he gets home around 930 M eats high fat protein.  Encouraged to limit how often he eats chips, consider granola bar or fruit for noon snack.  9 PM protein snack, encouraged lower fat meat choices, add fruit and or vege to this  and to other meals and snacks.  Has not been eating much fruit or vege strongly encouraged to start adding this to replace some of the high fat food choices.  Has done a nice job with portions, now needs to work on food choices.    Joined a gym, plan is go at least once per week with goal of 2 times per week.  Encouraged this.    Nice young man who is receptive to recommendations and willing to make changes.              Plan:  Follow up with MD 10.2018 and CDE 2.2019    Subjective and Objective:      Everardo Payan is referred by  for Diabetes Education.     Lab Results   Component Value Date    HGBA1C 12.1 (H) 08/08/2018       Current diabetes medications:    Metformin XR two 500 mg tablets two times a day  Invokana 100 mg daily    Goals       monitoring            1. Check bg once daily, rotate fasting and 2 PC  MET  2. A1c < 7.5        nutrition            1. Read label for cho and serving size  2. Keep cho content 4 per meal max of 5            Follow up:   CDE (certified diabetic educator)      Education:     Monitoring   Meter (per above goals): Competent  Monitoring: Competent  BG goals: Assessed and Discussed    Nutrition Management  Nutrition Management: Assessed and Discussed  Weight: Assessed and Discussed  Portions/Balance: Assessed and Discussed  Carb ID/Count: Assessed and Discussed  Label Reading: Discussed  Heart Healthy Fats: Assessed and Discussed  Menu Planning: Assessed and Discussed  Dining Out: Not addressed  Physical Activity: Assessed and Discussed  Medications: Assessed, Discussed and Competent  Orals: Competent    Acute Complications: Prevent, Detect, Treat:  Hypoglycemia: Assessed  Hyperglycemia: Assessed and Discussed  Sick Days:     Chronic Complications  Foot Care:Discussed  Skin Care: Discussed  Eye: Discussed and Competent  ABC: Assessed and Discussed  Teeth:Discussed and Competent  Goal Setting and Problem Solving: Assessed and Discussed  Barriers: Assessed  Psychosocial  Adjustments: Assessed      Time spent with the patient: 30 minutes for diabetes education and counseling.   Previous Education: yes  Visit Type:MILI Suarez  9/20/2018

## 2021-06-22 NOTE — PROGRESS NOTES
"Assessment:      Diabetes Mellitus type II, under excellent control.      Plan:        1.  Rx changes: none  2.  Education: Reviewed  ABCs  of diabetes management (respective goals in parentheses):  A1C (<7), blood pressure (<130/80), and cholesterol (LDL <100).  3.  Compliance at present is estimated to be excellent. Efforts to improve compliance (if necessary) will be directed at dietary modifications: keep making changes so can start coming off medications.  4. Follow up: 3 months      Complications of Diabetes:  none  Assessment of blood sugar control: excellent  Lab Results   Component Value Date    HGBA1C 6.1 (H) 12/03/2018     Statin medication (>40 or ^CVD Risk)- mentioned at this visit. Consider starting next visit.   At blood pressure goal (JNC 8 <140/90 all ages):   No results found for: MICROALBUR, [id number]- ordered will have done when able.   Ace/arb indicated and taking? Not yet. Discussed and consider starting next visit.   Up to date with yearly dilated retina eval? Saw optometry a few months ago and didn't want       Subjective:      Everardo Payan is a 33 y.o. male who presents for follow-up of Type 2 diabetes mellitus.  Current symptoms/problems include none .  Feels good.  Blood sugars under much better control after starting Invokana with the diabetic educator in October.  He had his A1c checked last month at work about a month ago and it was 7.9!  He is excited to see what his A1c is today because he says his lower blood sugars have been going on for even longer.    Known diabetic complications: none  Cardiovascular risk factors: diabetes mellitus, male gender, obesity (BMI >= 30 kg/m2) and sedentary lifestyle  Current diabetic medications include oral agents (dual therapy): Glucophage XR, Invokana 100 mg  Eye exam current (within one year): no  Weight trend: decreasing steadily  Prior visit with dietician: yes - diabetic educatro  Current diet: in general, a \"healthy\" diet  . Didn't feel " like hes changed that much  Current exercise: aerobics    Current monitoring regimen: home blood tests - daily  Home blood sugar records: fasting range: Average 120.  Was 147 and 162 around Thanksgiving and day after but those are the highest he has had.  One was 77 that was as low was.  Otherwise mostly 120 low 130s.  Any episodes of hypoglycemia? no    Is He on ACE inhibitor or angiotensin II receptor blocker?   No     The following portions of the patient's history were reviewed and updated as appropriate: allergies, current medications, past medical history and problem list.    Review of Systems  Pertinent items are noted in HPI.      Objective:      /70 (Patient Site: Left Arm, Patient Position: Sitting, Cuff Size: Adult Large)   Pulse 87   Wt (!) 318 lb 9 oz (144.5 kg)   SpO2 95%   BMI 49.89 kg/m      General:  alert, appears stated age and cooperative   Oropharynx: lips, mucosa, and tongue normal; teeth and gums normal    Eyes:  conjunctivae/corneas clear. PERRL, EOM's intact.    Lung: clear to auscultation bilaterally   Heart:  regular rate and rhythm, S1, S2 normal, no murmur, click, rub or gallop   Abdomen: soft, non-tender; bowel sounds normal; no masses,  no organomegaly   Extremities: extremities normal, atraumatic, no cyanosis or edema   Skin: warm and dry, no hyperpigmentation, vitiligo, or suspicious lesions   Pulses: 2+ and symmetric   Neuro: normal without focal findings, mental status, speech normal, alert and oriented x3, MINE and reflexes normal and symmetric     Diabetic foot exam:   Left: Pulses +2/4   Filament test present    Right: Pulses Dorsalis Pedis:  present  Posterior Tibial:  present   Filament test present      Lab Review  CO2 (mmol/L)   Date Value   08/13/2018 22     BUN (mg/dL)   Date Value   08/13/2018 12     Creatinine (mg/dL)   Date Value   08/13/2018 0.80

## 2021-07-03 NOTE — ADDENDUM NOTE
Addendum Note by Vane Rao DO at 8/21/2019  8:00 AM     Author: Vane Rao DO Service: -- Author Type: Physician    Filed: 8/27/2019 12:19 PM Encounter Date: 8/21/2019 Status: Signed    : Vane Rao DO (Physician)    Addended by: VANE RAO on: 8/27/2019 12:19 PM        Modules accepted: Orders

## 2021-07-03 NOTE — ADDENDUM NOTE
Addendum Note by Toyin Lawson CMA at 9/12/2018  5:10 PM     Author: Toyin Lawson CMA Service: -- Author Type: Certified Medical Assistant    Filed: 9/12/2018  5:10 PM Encounter Date: 9/10/2018 Status: Signed    : Toyin Lawson CMA (Certified Medical Assistant)    Addended by: TOYIN LAWSON on: 9/12/2018 05:10 PM        Modules accepted: Orders

## 2021-10-10 ENCOUNTER — HEALTH MAINTENANCE LETTER (OUTPATIENT)
Age: 36
End: 2021-10-10

## 2022-01-30 ENCOUNTER — HEALTH MAINTENANCE LETTER (OUTPATIENT)
Age: 37
End: 2022-01-30

## 2022-05-22 ENCOUNTER — HEALTH MAINTENANCE LETTER (OUTPATIENT)
Age: 37
End: 2022-05-22

## 2022-09-18 ENCOUNTER — HEALTH MAINTENANCE LETTER (OUTPATIENT)
Age: 37
End: 2022-09-18

## 2023-05-07 ENCOUNTER — HEALTH MAINTENANCE LETTER (OUTPATIENT)
Age: 38
End: 2023-05-07

## 2023-06-04 ENCOUNTER — HEALTH MAINTENANCE LETTER (OUTPATIENT)
Age: 38
End: 2023-06-04

## 2023-12-17 ENCOUNTER — HEALTH MAINTENANCE LETTER (OUTPATIENT)
Age: 38
End: 2023-12-17

## 2024-06-22 ENCOUNTER — TRANSFERRED RECORDS (OUTPATIENT)
Dept: MULTI SPECIALTY CLINIC | Facility: CLINIC | Age: 39
End: 2024-06-22

## 2024-06-22 LAB — RETINOPATHY: NORMAL

## 2024-11-01 ENCOUNTER — OFFICE VISIT (OUTPATIENT)
Dept: FAMILY MEDICINE | Facility: CLINIC | Age: 39
End: 2024-11-01
Payer: COMMERCIAL

## 2024-11-01 VITALS
HEART RATE: 74 BPM | BODY MASS INDEX: 52.48 KG/M2 | OXYGEN SATURATION: 94 % | WEIGHT: 315 LBS | RESPIRATION RATE: 14 BRPM | DIASTOLIC BLOOD PRESSURE: 79 MMHG | HEIGHT: 65 IN | SYSTOLIC BLOOD PRESSURE: 129 MMHG | TEMPERATURE: 97.7 F

## 2024-11-01 DIAGNOSIS — E11.9 TYPE 2 DIABETES MELLITUS WITHOUT COMPLICATION, WITHOUT LONG-TERM CURRENT USE OF INSULIN (H): ICD-10-CM

## 2024-11-01 DIAGNOSIS — I10 PRIMARY HYPERTENSION: Primary | ICD-10-CM

## 2024-11-01 DIAGNOSIS — Z11.59 NEED FOR HEPATITIS C SCREENING TEST: ICD-10-CM

## 2024-11-01 PROBLEM — K35.80 ACUTE APPENDICITIS: Status: ACTIVE | Noted: 2018-07-23

## 2024-11-01 PROBLEM — L30.9 ECZEMA: Status: ACTIVE | Noted: 2021-07-29

## 2024-11-01 PROBLEM — K35.80 ACUTE APPENDICITIS: Status: RESOLVED | Noted: 2018-07-23 | Resolved: 2024-11-01

## 2024-11-01 LAB
ANION GAP SERPL CALCULATED.3IONS-SCNC: 12 MMOL/L (ref 7–15)
BUN SERPL-MCNC: 16.8 MG/DL (ref 6–20)
CALCIUM SERPL-MCNC: 9.8 MG/DL (ref 8.8–10.4)
CHLORIDE SERPL-SCNC: 100 MMOL/L (ref 98–107)
CREAT SERPL-MCNC: 0.7 MG/DL (ref 0.67–1.17)
EGFRCR SERPLBLD CKD-EPI 2021: >90 ML/MIN/1.73M2
EST. AVERAGE GLUCOSE BLD GHB EST-MCNC: 157 MG/DL
GLUCOSE SERPL-MCNC: 173 MG/DL (ref 70–99)
HBA1C MFR BLD: 7.1 % (ref 0–5.6)
HCO3 SERPL-SCNC: 24 MMOL/L (ref 22–29)
HCV AB SERPL QL IA: NONREACTIVE
POTASSIUM SERPL-SCNC: 4.1 MMOL/L (ref 3.4–5.3)
SODIUM SERPL-SCNC: 136 MMOL/L (ref 135–145)

## 2024-11-01 PROCEDURE — 86803 HEPATITIS C AB TEST: CPT | Performed by: PHYSICIAN ASSISTANT

## 2024-11-01 PROCEDURE — 83036 HEMOGLOBIN GLYCOSYLATED A1C: CPT | Performed by: PHYSICIAN ASSISTANT

## 2024-11-01 PROCEDURE — 99204 OFFICE O/P NEW MOD 45 MIN: CPT | Mod: 25 | Performed by: PHYSICIAN ASSISTANT

## 2024-11-01 PROCEDURE — 90656 IIV3 VACC NO PRSV 0.5 ML IM: CPT | Performed by: PHYSICIAN ASSISTANT

## 2024-11-01 PROCEDURE — 91320 SARSCV2 VAC 30MCG TRS-SUC IM: CPT | Performed by: PHYSICIAN ASSISTANT

## 2024-11-01 PROCEDURE — 80048 BASIC METABOLIC PNL TOTAL CA: CPT | Performed by: PHYSICIAN ASSISTANT

## 2024-11-01 PROCEDURE — 36415 COLL VENOUS BLD VENIPUNCTURE: CPT | Performed by: PHYSICIAN ASSISTANT

## 2024-11-01 PROCEDURE — 90480 ADMN SARSCOV2 VAC 1/ONLY CMP: CPT | Performed by: PHYSICIAN ASSISTANT

## 2024-11-01 PROCEDURE — 90471 IMMUNIZATION ADMIN: CPT | Performed by: PHYSICIAN ASSISTANT

## 2024-11-01 RX ORDER — LISINOPRIL 10 MG/1
TABLET ORAL
COMMUNITY
End: 2024-11-01

## 2024-11-01 RX ORDER — TIRZEPATIDE 5 MG/.5ML
5 INJECTION, SOLUTION SUBCUTANEOUS
Qty: 2 ML | Refills: 0 | Status: SHIPPED | OUTPATIENT
Start: 2024-11-01

## 2024-11-01 RX ORDER — SILDENAFIL 25 MG/1
25 TABLET, FILM COATED ORAL DAILY PRN
Qty: 10 TABLET | Refills: 3 | Status: SHIPPED | OUTPATIENT
Start: 2024-11-01

## 2024-11-01 RX ORDER — SILDENAFIL 25 MG/1
TABLET, FILM COATED ORAL
COMMUNITY
End: 2024-11-01

## 2024-11-01 RX ORDER — TIRZEPATIDE 7.5 MG/.5ML
7.5 INJECTION, SOLUTION SUBCUTANEOUS
Qty: 2 ML | Refills: 0 | Status: SHIPPED | OUTPATIENT
Start: 2024-11-01

## 2024-11-01 RX ORDER — DAPAGLIFLOZIN 10 MG/1
TABLET, FILM COATED ORAL
COMMUNITY

## 2024-11-01 RX ORDER — LISINOPRIL 10 MG/1
10 TABLET ORAL DAILY
Qty: 90 TABLET | Refills: 3 | Status: SHIPPED | OUTPATIENT
Start: 2024-11-01

## 2024-11-01 RX ORDER — TIRZEPATIDE 2.5 MG/.5ML
2.5 INJECTION, SOLUTION SUBCUTANEOUS
Qty: 2 ML | Refills: 0 | Status: SHIPPED | OUTPATIENT
Start: 2024-11-01

## 2024-11-01 RX ORDER — METFORMIN HYDROCHLORIDE 500 MG/1
1000 TABLET, EXTENDED RELEASE ORAL 2 TIMES DAILY
Qty: 360 TABLET | Refills: 3 | Status: SHIPPED | OUTPATIENT
Start: 2024-11-01

## 2024-11-01 NOTE — PROGRESS NOTES
"  Assessment & Plan     Primary hypertension  Well controlled  - BASIC METABOLIC PANEL  - BASIC METABOLIC PANEL    Need for hepatitis C screening test  - Hepatitis C Screen Reflex to HCV RNA Quant and Genotype  - Hepatitis C Screen Reflex to HCV RNA Quant and Genotype    Type 2 diabetes mellitus without complication, without long-term current use of insulin (H)  Stable.  Will begin mounjaro.  Follow up 3 months, sooner with any issues. If he tolerates mounjaro, will stop januvia as there is not added glucose lowering benefit.   - MOUNJARO 2.5 MG/0.5ML SOAJ  Dispense: 2 mL; Refill: 0  - Hemoglobin A1c  - MOUNJARO 5 MG/0.5ML SOAJ  Dispense: 2 mL; Refill: 0  - Tirzepatide (MOUNJARO) 7.5 MG/0.5ML SOAJ  Dispense: 2 mL; Refill: 0  - lisinopril (ZESTRIL) 10 MG tablet  Dispense: 90 tablet; Refill: 3  - metFORMIN (GLUCOPHAGE XR) 500 MG 24 hr tablet  Dispense: 360 tablet; Refill: 3  - sildenafil (VIAGRA) 25 MG tablet  Dispense: 10 tablet; Refill: 3  - sitagliptin (JANUVIA) 100 MG tablet  Dispense: 90 tablet; Refill: 3  - Hemoglobin A1c    The longitudinal plan of care for the diagnosis(es)/condition(s) as documented were addressed during this visit. Due to the added complexity in care, I will continue to support Josiah in the subsequent management and with ongoing continuity of care.          BMI  Estimated body mass index is 56.97 kg/m  as calculated from the following:    Height as of this encounter: 1.657 m (5' 5.25\").    Weight as of this encounter: 156.5 kg (345 lb).   Weight management plan: Discussed healthy diet and exercise guidelines          Hollis Mcgee is a 39 year old, presenting for the following health issues:  Establish Care, Recheck Medication, and Diabetes      11/1/2024    10:41 AM   Additional Questions   Roomed by Twan     History of Present Illness       Diabetes:   He presents for follow up of diabetes.    He is not checking blood glucose.        He is concerned about other.    He is not " "experiencing numbness or burning in feet, excessive thirst, blurry vision, weight changes or redness, sores or blisters on feet.           He eats 2-3 servings of fruits and vegetables daily.He consumes 1 sweetened beverage(s) daily.He exercises with enough effort to increase his heart rate 9 or less minutes per day.  He exercises with enough effort to increase his heart rate 3 or less days per week. He is missing 1 dose(s) of medications per week.       Establish care- was previously seen at [place] when he worked for [employer].     Diabetes- currently on metformin Januvia and Farxiga.   he is working for [employer] currently so gets in walking at stores.  not doing formal exercise at this time  Eats 4 eggs and breakfast sausage for breakfast, eats a peanut butter and honey sandwich for lunch.  Eats vegetables and meat for dinner.            Review of Systems  Constitutional, HEENT, cardiovascular, pulmonary, gi and gu systems are negative, except as otherwise noted.      Objective    /79   Pulse 74   Temp 97.7  F (36.5  C) (Oral)   Resp 14   Ht 1.657 m (5' 5.25\")   Wt (!) 156.5 kg (345 lb)   SpO2 94%   BMI 56.97 kg/m    Body mass index is 56.97 kg/m .  Physical Exam   GENERAL: alert and no distress  NECK: no adenopathy, no asymmetry, masses, or scars  RESP: lungs clear to auscultation - no rales, rhonchi or wheezes  CV: regular rate and rhythm, normal S1 S2, no S3 or S4, no murmur, click or rub, no peripheral edema  ABDOMEN: soft, nontender, no hepatosplenomegaly, no masses and bowel sounds normal  MS: no gross musculoskeletal defects noted, no edema            Signed Electronically by: Gina ePdro PA-C    "

## 2024-12-11 ENCOUNTER — TELEPHONE (OUTPATIENT)
Dept: FAMILY MEDICINE | Facility: CLINIC | Age: 39
End: 2024-12-11
Payer: COMMERCIAL

## 2024-12-11 NOTE — TELEPHONE ENCOUNTER
S-(situation): Incoming call from patient. Reviewed below message from Marlon regarding appointment tomorrow.     B-(background): Patient siad that he has not seen a sleep medicine provider since being with [facility].     A-(assessment): patient said he needs this done ASAP as her only have 30 days to appeal.     R-(recommendations):   Patient wondering if edmund ramirez can put in a sleep medicine referral for patient or if edmund would feel okay seeing patient and completing the needed paperwork .    Routing to edmund to advise.     Malgorzata GARCIA RN

## 2024-12-11 NOTE — TELEPHONE ENCOUNTER
Talked to Pt, his last sleep study was about 15 yrs ago and his machine is about that old. He needs to send a report of his sleep pattern to his his work. I told him I will talk to Gina and give him a call tomorrow.

## 2024-12-11 NOTE — TELEPHONE ENCOUNTER
I don't know what the paperwork is so I don't know if I can complete the paperwork.  Where is the paperwork?    Gina Pedro PA-C

## 2024-12-11 NOTE — TELEPHONE ENCOUNTER
Writer called pt, left voicemail requesting call back. No notation if ok to leave detailed voicemail for pt.     Need to clarify what needs to be completed for the appeal.     RN task:    What is the appeal for? TSA CPAP clearance or something else?    Does pt have forms for provider to complete? If so, can they upload them to their mychart and send message to PCP attaching the forms?    Ritika Aguilar RN

## 2024-12-12 ENCOUNTER — VIRTUAL VISIT (OUTPATIENT)
Dept: FAMILY MEDICINE | Facility: CLINIC | Age: 39
End: 2024-12-12
Payer: COMMERCIAL

## 2024-12-12 ENCOUNTER — MYC MEDICAL ADVICE (OUTPATIENT)
Dept: FAMILY MEDICINE | Facility: CLINIC | Age: 39
End: 2024-12-12

## 2024-12-12 ENCOUNTER — E-CONSULT (OUTPATIENT)
Dept: SLEEP MEDICINE | Facility: CLINIC | Age: 39
End: 2024-12-12

## 2024-12-12 DIAGNOSIS — G47.33 OSA (OBSTRUCTIVE SLEEP APNEA): Primary | ICD-10-CM

## 2024-12-12 NOTE — PROGRESS NOTES
Josiah is a 39 year old who is being evaluated via a billable video visit.    How would you like to obtain your AVS? MyChart  If the video visit is dropped, the invitation should be resent by: Text to cell phone: [phone]  Will anyone else be joining your video visit? No      Assessment & Plan     COLTON (obstructive sleep apnea)  Applying for a job at [employer] and needs proof that he has used his CPAP machine at least 4 hours a night for the past 30 days.  I do not know how to access information on on his machine, will do an E consult to [facility] to see if he will need a new sleep study versus an appointment with one of their providers.  Encouraged him also to contact [facility] sleep study where he had his initial consult.    - Adult E-Consult to Sleep Medicine (Outpt Provider to Specialist Written Question & Response)      The longitudinal plan of care for the diagnosis(es)/condition(s) as documented were addressed during this visit. Due to the added complexity in care, I will continue to support Josiah in the subsequent management and with ongoing continuity of care.              Subjective   Josiah is a 39 year old, presenting for the following health issues:  No chief complaint on file.    HPI     Colton-patient was diagnosed about 15 years ago at [facility] sleep clinic and has used his CPAP for the past 15 years reports it continues to work well he has not lost or gained weight in the past 15 years has a [id number] machine.  He is applying for a job at [employer] and he needs proof that he has used his CPAP for at least 4 hours per night for the last 30 days.  He notes there is a card in his machine but he does not know if it records or if it can access that information.        Review of Systems  Constitutional, HEENT, cardiovascular, pulmonary, gi and gu systems are negative, except as otherwise noted.      Objective           Vitals:  No vitals were obtained today due to virtual visit.    Physical Exam    GENERAL: alert and no distress  EYES: Eyes grossly normal to inspection.  No discharge or erythema, or obvious scleral/conjunctival abnormalities.  RESP: No audible wheeze, cough, or visible cyanosis.    SKIN: Visible skin clear. No significant rash, abnormal pigmentation or lesions.  NEURO: Cranial nerves grossly intact.  Mentation and speech appropriate for age.  PSYCH: Appropriate affect, tone, and pace of words          Video-Visit Details    Type of service:  Video Visit   Originating Location (pt. Location): Home    Distant Location (provider location):  On-site  Platform used for Video Visit: Rita  Signed Electronically by: Gina Pedro PA-C

## 2024-12-12 NOTE — PROGRESS NOTES
12/12/2024     E-Consult has been denied due to: Complexity of question, needs in-person referral.    Interprofessional consultation requested by:  Gina Pedro PA-C      Clinical Question/Purpose: MY CLINICAL QUESTION IS: pt diagnosed with sleep apnea 15 years ago at [facility], uses cpap resmed 10.  Applying for job at [employer] and needs proof that he has used his machine for at least 4 hours per night for the last month.  Would he need a new sleep study or an appointment with his current machine?  I am unsure what kind of referral is needed or if he can pull this information from his machine (needs medical professional to complete paperwork)  denies weight loss or weight gain in 15 years.     Patient assessment and information reviewed: sleep study interpretation notes in chart 10/21/15    Recommendations: He would need a new sleep medicine appointment with a copy of his study and a download from his device       The recommendations provided in this E-Consult are based on a review of clinical data pertinent to the clinical question presented, without a review of the patient's complete medical record or, the benefit of a comprehensive in-person or virtual patient evaluation. This consultation should not replace the clinical judgement and evaluation of the provider ordering this E-Consult. Any new clinical issues, or changes in patient status since the filing of this E-Consult will need to be taken into account when assessing these recommendations. Please contact me if you have further questions.    My total time spent reviewing clinical information and formulating assessment was 5 minutes.        You Cohen MD

## 2024-12-23 ENCOUNTER — OFFICE VISIT (OUTPATIENT)
Dept: SLEEP MEDICINE | Facility: CLINIC | Age: 39
End: 2024-12-23
Attending: PHYSICIAN ASSISTANT
Payer: COMMERCIAL

## 2024-12-23 VITALS
BODY MASS INDEX: 50.62 KG/M2 | DIASTOLIC BLOOD PRESSURE: 90 MMHG | HEIGHT: 66 IN | HEART RATE: 103 BPM | SYSTOLIC BLOOD PRESSURE: 146 MMHG | WEIGHT: 315 LBS | OXYGEN SATURATION: 96 %

## 2024-12-23 DIAGNOSIS — G47.33 OSA (OBSTRUCTIVE SLEEP APNEA): ICD-10-CM

## 2024-12-23 PROCEDURE — 99203 OFFICE O/P NEW LOW 30 MIN: CPT | Performed by: INTERNAL MEDICINE

## 2024-12-23 ASSESSMENT — SLEEP AND FATIGUE QUESTIONNAIRES
HOW LIKELY ARE YOU TO NOD OFF OR FALL ASLEEP WHILE SITTING AND TALKING TO SOMEONE: WOULD NEVER DOZE
HOW LIKELY ARE YOU TO NOD OFF OR FALL ASLEEP WHILE SITTING QUIETLY AFTER LUNCH WITHOUT ALCOHOL: WOULD NEVER DOZE
HOW LIKELY ARE YOU TO NOD OFF OR FALL ASLEEP WHILE SITTING AND READING: SLIGHT CHANCE OF DOZING
HOW LIKELY ARE YOU TO NOD OFF OR FALL ASLEEP IN A CAR, WHILE STOPPED FOR A FEW MINUTES IN TRAFFIC: WOULD NEVER DOZE
HOW LIKELY ARE YOU TO NOD OFF OR FALL ASLEEP WHEN YOU ARE A PASSENGER IN A CAR FOR AN HOUR WITHOUT A BREAK: WOULD NEVER DOZE
HOW LIKELY ARE YOU TO NOD OFF OR FALL ASLEEP WHILE WATCHING TV: SLIGHT CHANCE OF DOZING
HOW LIKELY ARE YOU TO NOD OFF OR FALL ASLEEP WHILE SITTING INACTIVE IN A PUBLIC PLACE: WOULD NEVER DOZE
HOW LIKELY ARE YOU TO NOD OFF OR FALL ASLEEP WHILE LYING DOWN TO REST IN THE AFTERNOON WHEN CIRCUMSTANCES PERMIT: SLIGHT CHANCE OF DOZING

## 2024-12-23 NOTE — PROGRESS NOTES
Sleep Consultation:    Date on this visit: 12/23/2024    Everardo Payan  is referred by Gina Pedro for a sleep consultation.     Primary Physician: Donna Hagen     Everardo Payan is a 39 years old male with medical history significant for DM-2, HTN and severe obesity who presents to clinic for evaluation and management of sleep apnea.     Chart review shows that he had a PSG on 10/31/2015 at weight of 355 lbs through [facility]. This reported severe obstructive sleep apnea with an apnea hypopnea index of 98.2 per hour. Lowest O2 saturation was 55%. CPAP at 13 cm H2O was reported to be effective in supine REM. TcCO2 did not report significant hypoventilation.     He is prescribed CPAP at 13 cm H2O.     Overall, he rates the experience with PAP as good. The mask is comfortable. The mask is not leaking.  He is not snoring with the mask on. He is not having gasp arousals.  He is not having significant oral/nasal dryness. The pressure settings are comfortable.     He uses full-face mask.     He does feel rested in the morning.    ResMed   CPAP 13 cmH2O download:  90/90 total days of use. 0 nonuse days. 74% days with >4 hours use.  Average use 4 hours 22 minutes per day. Median Leak 9.8 L/min. 95%ile Leak 18.8 L/min. AHI 0.5.     Patient reports that historically he has been a short sleeper.  His father is also this way and only sleep 3 or 4 hours at night.    Everardo goes to sleep at 1:00 AM during the week.  He finishes work at 4:30 PM.  She spends the rest of the night watching TV or designing on his computer.  He wakes up at 5:50 AM. He falls asleep in 15 minutes.  Everardo denies difficulty falling asleep.  He wakes up 0 times a night.  On weekends, Everardo goes to sleep at 2:00 AM.  He wakes up at 8:00 AM. He falls asleep in 15 minutes.  Patient gets an average of 5 hours of sleep per night.    Everardo denies any sleep walking, sleep talking, dream enactment, sleep paralysis, cataplexy, and  "hypnogogic/hypnopompic hallucinations.    Everardo has difficulty breathing through his nose.      Patient's [place] Sleepiness score 3/24 consistent with no daytime sleepiness.      Everardo naps 0-1 times per week for 20-30 minutes, feels refreshed after naps. He takes no inadvertant naps.  Patient was counseled on the importance of driving while alert, to pull over if drowsy, or nap before getting into the vehicle if sleepy.      He uses 1 sodas/day, 1 energy drinks/day. Last caffeine intake is usually before 5 PM.    Problem List:  Patient Active Problem List    Diagnosis Date Noted    Eczema 07/29/2021     Priority: Medium    Diabetes mellitus due to underlying condition with hyperglycemia, without long-term current use of insulin (H) 11/26/2019     Priority: Medium    Male infertility 09/29/2019     Priority: Medium    Type 2 diabetes mellitus (H) 08/08/2018     Priority: Medium    GARRETT (obstructive sleep apnea) 01/08/2016     Priority: Medium     Overview:   Severe GARRETT (obstructive sleep apnea)       Severe GARRETT (obstructive sleep apnea)      Hypertensive disorder 12/07/2015     Priority: Medium     Overview:   Patient declined to take medication.  Understands risk of having untreated   hypertension.       Patient declined to take medication.  Understands risk of having untreated hypertension.      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    GENERAL APPEARANCE: healthy, alert, and no distress  HENT: oropharynx crowded and tongue base enlarged  NEURO: mentation intact and " speech normal  PSYCH: mentation appears normal and affect normal/bright  Mallampati Class: IV.  Tonsillar Stage: 1  hidden by pillars.    Impression/Plan:    Severe obstructive sleep apnea  Severe obesity  Hypertension    Patient is a 39 years old male, with a BMI of 55, medical comorbidity of hypertension, diabetes, who has been on successful CPAP therapy for about 9 years.  Review of his previous sleep study indicates severe sleep apnea at baseline with an apnea-hypopnea index of 98/h and effective titration at CPAP at 13 cm H2O.  Review of  download data from his device shows normal residual AHI at current therapy settings    As noted, patient seems to have a delayed circadian rhythm and is also reported to be a physiologically short sleeper.  He reports that his father is also a short sleeper.  I counseled him regarding optimal sleep habits, better stimulus control and trying to extend his sleep time slightly to move it closer to about 6 hours.    Plan:     Continue CPAP therapy at a pressure of 13 cm H2O    Obstructive sleep apnea reviewed.  Complications of untreated sleep apnea were reviewed.      Electronically signed by Dr. Kendall Smith, Diplomate, Sleep Medicine, ABPN         CC: Gina Pedro

## 2025-01-05 ENCOUNTER — OFFICE VISIT (OUTPATIENT)
Dept: URGENT CARE | Facility: URGENT CARE | Age: 40
End: 2025-01-05
Payer: COMMERCIAL

## 2025-01-05 ENCOUNTER — ANCILLARY PROCEDURE (OUTPATIENT)
Dept: GENERAL RADIOLOGY | Facility: CLINIC | Age: 40
End: 2025-01-05
Attending: INTERNAL MEDICINE
Payer: COMMERCIAL

## 2025-01-05 VITALS
TEMPERATURE: 97.7 F | OXYGEN SATURATION: 99 % | RESPIRATION RATE: 20 BRPM | SYSTOLIC BLOOD PRESSURE: 142 MMHG | HEART RATE: 76 BPM | BODY MASS INDEX: 57.62 KG/M2 | WEIGHT: 315 LBS | DIASTOLIC BLOOD PRESSURE: 88 MMHG

## 2025-01-05 DIAGNOSIS — S29.9XXA INJURY OF CHEST WALL, INITIAL ENCOUNTER: Primary | ICD-10-CM

## 2025-01-05 DIAGNOSIS — S29.9XXA INJURY OF CHEST WALL, INITIAL ENCOUNTER: ICD-10-CM

## 2025-01-05 PROCEDURE — 71101 X-RAY EXAM UNILAT RIBS/CHEST: CPT | Mod: TC | Performed by: RADIOLOGY

## 2025-01-05 PROCEDURE — 99213 OFFICE O/P EST LOW 20 MIN: CPT | Performed by: INTERNAL MEDICINE

## 2025-01-05 NOTE — PROGRESS NOTES
SUBJECTIVE:  Chief complaint of chest wall pain.  He had slipped in the shower a little over one week ago and developed some bruising across the left side of his thorax and lower flank. Originally the pain was localized to the left flank; now some pain in the anterior chest wall on the left side and now across to the left posterior flank.  Pain is worse with movement or with taking a deep breath. Pain is not radiating outside of the torso.      ROS:  The following systems have been completely reviewed and are negative except as noted in the HPI: CONSTITUTIONAL, CARDIOVASCULAR, PULMONARY, and MUSCULOSKELETAL    OBJECTIVE:  BP (!) 142/88 (BP Location: Right arm, Patient Position: Chair, Cuff Size: Adult Large)   Pulse 76   Temp 97.7  F (36.5  C) (Temporal)   Resp 20   Wt (!) 161.9 kg (357 lb)   SpO2 99%   BMI 57.62 kg/m    GEN: alert and interactive  COR: RRR, normal S1/S2, no murmur or rub  LUNG: clear to auscultation bilaterally   M/SKEL: tender to palpation in the posterolateral thorax and along the left sternal margin; tolerates resisted ROM in the left shoulder although there is pain with resisted shoulder adduction    Plain films of the chest and ribs on the left, which I have personally reviewed and interpreted, is negative for fracture.  Radiologist's review is pending.    ASSESSMENT/PLAN:    ICD-10-CM    1. Injury of chest wall, initial encounter  S29.9XXA XR Ribs & Chest Left G/E 3 Views      Nothing relative to bony injury that would require any intervention other than time and OTC analgesics.    Lloyd Freedman MD

## 2025-04-28 DIAGNOSIS — E11.9 TYPE 2 DIABETES MELLITUS WITHOUT COMPLICATION, WITHOUT LONG-TERM CURRENT USE OF INSULIN (H): ICD-10-CM

## 2025-04-28 NOTE — TELEPHONE ENCOUNTER
Medication Question or Refill        What medication are you calling about (include dose and sig)?: empagliflozin (JARDIANCE) 25 MG TABS tablet     Preferred Pharmacy:   [id number] - [place] [[place], [place] - [address] [place] [address]  [address] [facility] [address]  Phone: [phone] Fax: [phone]      Controlled Substance Agreement on file:   CSA -- Patient Level:    CSA: None found at the patient level.       Who prescribed the medication?: PCP    Do you need a refill? Yes    When did you use the medication last? N/A

## 2025-05-05 NOTE — TELEPHONE ENCOUNTER
Attempt #1. No answer. LVM to call clinic and ask to speak to a nurse. Please relay message below upon return call.      This patient should have medication left on last order OR refills on file at preferred pharmacy. Please let patient know.     Macario FLORES RN  [facility]

## 2025-05-07 NOTE — TELEPHONE ENCOUNTER
2nd: Left voicemail requesting patient return call to clinic. Sent [employer] message to patient as well.    If patient calls back, please relay that he should have Jardiance refills available at pharmacy.        Ying Pandey RN  [facility]

## 2025-06-02 ENCOUNTER — TRANSFERRED RECORDS (OUTPATIENT)
Dept: HEALTH INFORMATION MANAGEMENT | Facility: CLINIC | Age: 40
End: 2025-06-02
Payer: COMMERCIAL

## 2025-08-09 ENCOUNTER — HEALTH MAINTENANCE LETTER (OUTPATIENT)
Age: 40
End: 2025-08-09

## 2025-08-12 ENCOUNTER — OFFICE VISIT (OUTPATIENT)
Dept: FAMILY MEDICINE | Facility: CLINIC | Age: 40
End: 2025-08-12
Payer: COMMERCIAL

## 2025-08-12 VITALS
DIASTOLIC BLOOD PRESSURE: 85 MMHG | WEIGHT: 315 LBS | TEMPERATURE: 97 F | SYSTOLIC BLOOD PRESSURE: 125 MMHG | HEART RATE: 79 BPM | OXYGEN SATURATION: 100 % | RESPIRATION RATE: 18 BRPM | BODY MASS INDEX: 47.74 KG/M2 | HEIGHT: 68 IN

## 2025-08-12 DIAGNOSIS — E08.65 DIABETES MELLITUS DUE TO UNDERLYING CONDITION WITH HYPERGLYCEMIA, WITHOUT LONG-TERM CURRENT USE OF INSULIN (H): Primary | ICD-10-CM

## 2025-08-12 DIAGNOSIS — E11.9 TYPE 2 DIABETES MELLITUS WITHOUT COMPLICATION, WITHOUT LONG-TERM CURRENT USE OF INSULIN (H): ICD-10-CM

## 2025-08-12 DIAGNOSIS — Z11.3 ROUTINE SCREENING FOR STI (SEXUALLY TRANSMITTED INFECTION): ICD-10-CM

## 2025-08-12 LAB
EST. AVERAGE GLUCOSE BLD GHB EST-MCNC: 166 MG/DL
HBA1C MFR BLD: 7.4 % (ref 0–5.6)
T PALLIDUM AB SER QL: NONREACTIVE

## 2025-08-12 PROCEDURE — 3079F DIAST BP 80-89 MM HG: CPT | Performed by: PHYSICIAN ASSISTANT

## 2025-08-12 PROCEDURE — 83036 HEMOGLOBIN GLYCOSYLATED A1C: CPT | Performed by: PHYSICIAN ASSISTANT

## 2025-08-12 PROCEDURE — 86780 TREPONEMA PALLIDUM: CPT | Performed by: PHYSICIAN ASSISTANT

## 2025-08-12 PROCEDURE — 3074F SYST BP LT 130 MM HG: CPT | Performed by: PHYSICIAN ASSISTANT

## 2025-08-12 PROCEDURE — 87491 CHLMYD TRACH DNA AMP PROBE: CPT | Performed by: PHYSICIAN ASSISTANT

## 2025-08-12 PROCEDURE — 99214 OFFICE O/P EST MOD 30 MIN: CPT | Performed by: PHYSICIAN ASSISTANT

## 2025-08-12 PROCEDURE — 87591 N.GONORRHOEAE DNA AMP PROB: CPT | Performed by: PHYSICIAN ASSISTANT

## 2025-08-12 PROCEDURE — G2211 COMPLEX E/M VISIT ADD ON: HCPCS | Performed by: PHYSICIAN ASSISTANT

## 2025-08-12 PROCEDURE — 87389 HIV-1 AG W/HIV-1&-2 AB AG IA: CPT | Performed by: PHYSICIAN ASSISTANT

## 2025-08-12 PROCEDURE — 3051F HG A1C>EQUAL 7.0%<8.0%: CPT | Performed by: PHYSICIAN ASSISTANT

## 2025-08-12 PROCEDURE — 36415 COLL VENOUS BLD VENIPUNCTURE: CPT | Performed by: PHYSICIAN ASSISTANT

## 2025-08-12 RX ORDER — METFORMIN HYDROCHLORIDE 500 MG/1
1000 TABLET, EXTENDED RELEASE ORAL 2 TIMES DAILY
Qty: 360 TABLET | Refills: 3 | Status: SHIPPED | OUTPATIENT
Start: 2025-08-12

## 2025-08-12 RX ORDER — TIRZEPATIDE 7.5 MG/.5ML
7.5 INJECTION, SOLUTION SUBCUTANEOUS
Qty: 2 ML | Refills: 0 | Status: SHIPPED | OUTPATIENT
Start: 2025-08-12 | End: 2025-09-09

## 2025-08-12 RX ORDER — TIRZEPATIDE 2.5 MG/.5ML
2.5 INJECTION, SOLUTION SUBCUTANEOUS
Qty: 2 ML | Refills: 0 | Status: SHIPPED | OUTPATIENT
Start: 2025-08-12 | End: 2025-09-09

## 2025-08-12 RX ORDER — SILDENAFIL 25 MG/1
25 TABLET, FILM COATED ORAL DAILY PRN
Qty: 10 TABLET | Refills: 3 | Status: SHIPPED | OUTPATIENT
Start: 2025-08-12

## 2025-08-12 RX ORDER — LISINOPRIL 10 MG/1
10 TABLET ORAL DAILY
Qty: 90 TABLET | Refills: 3 | Status: SHIPPED | OUTPATIENT
Start: 2025-08-12

## 2025-08-12 RX ORDER — TIRZEPATIDE 5 MG/.5ML
5 INJECTION, SOLUTION SUBCUTANEOUS
Qty: 2 ML | Refills: 0 | Status: SHIPPED | OUTPATIENT
Start: 2025-08-12 | End: 2025-09-09

## 2025-08-13 LAB
C TRACH DNA SPEC QL PROBE+SIG AMP: NEGATIVE
HIV 1+2 AB+HIV1 P24 AG SERPL QL IA: NONREACTIVE
N GONORRHOEA DNA SPEC QL NAA+PROBE: NEGATIVE
SPECIMEN TYPE: NORMAL